# Patient Record
Sex: FEMALE | Race: WHITE
[De-identification: names, ages, dates, MRNs, and addresses within clinical notes are randomized per-mention and may not be internally consistent; named-entity substitution may affect disease eponyms.]

---

## 2018-07-17 ENCOUNTER — HOSPITAL ENCOUNTER (EMERGENCY)
Dept: HOSPITAL 25 - UCCORT | Age: 73
Discharge: HOME | End: 2018-07-17
Payer: MEDICARE

## 2018-07-17 VITALS — DIASTOLIC BLOOD PRESSURE: 60 MMHG | SYSTOLIC BLOOD PRESSURE: 133 MMHG

## 2018-07-17 DIAGNOSIS — E07.9: ICD-10-CM

## 2018-07-17 DIAGNOSIS — B96.20: ICD-10-CM

## 2018-07-17 DIAGNOSIS — E78.5: ICD-10-CM

## 2018-07-17 DIAGNOSIS — I10: ICD-10-CM

## 2018-07-17 DIAGNOSIS — E11.9: ICD-10-CM

## 2018-07-17 DIAGNOSIS — Z86.73: ICD-10-CM

## 2018-07-17 DIAGNOSIS — N39.0: Primary | ICD-10-CM

## 2018-07-17 PROCEDURE — G0463 HOSPITAL OUTPT CLINIC VISIT: HCPCS

## 2018-07-17 PROCEDURE — 81003 URINALYSIS AUTO W/O SCOPE: CPT

## 2018-07-17 PROCEDURE — 99202 OFFICE O/P NEW SF 15 MIN: CPT

## 2018-07-17 PROCEDURE — 87077 CULTURE AEROBIC IDENTIFY: CPT

## 2018-07-17 PROCEDURE — 87086 URINE CULTURE/COLONY COUNT: CPT

## 2018-07-17 PROCEDURE — 87186 SC STD MICRODIL/AGAR DIL: CPT

## 2018-07-17 NOTE — UC
Complaint Female HPI





- HPI Summary


HPI Summary: 





Patient is visiting from out of town.  She is complaining of urinary 

incontinence at night, frequent urination, a sense of urgency and a urine feels 

"hot".  She also notes that today her urine smells foul.  She denies any 

associated fever or abdominal pain.  She's had vague symptoms since the 13th of 

last month.





- History Of Current Complaint


Chief Complaint: UCGU


Stated Complaint: URINARY


Time Seen by Provider: 18 10:56


Hx Obtained From: Patient


Onset/Duration: Gradual Onset


Timing: Constant


Pain Intensity: 0


Aggravating Factor(s): Nothing


Alleviating Factor(s): Nothing


Associated Signs And Symptoms: Negative: Fever, Back Pain





- Allergies/Home Medications


Allergies/Adverse Reactions: 


 Allergies











Allergy/AdvReac Type Severity Reaction Status Date / Time


 


No Known Allergies Allergy   Verified 18 10:33











Home Medications: 


 Home Medications





Cholecalciferol TAB* [Vitamin D TAB*] 1,000 unit PO DAILY 18 [History 

Confirmed 18]


Citalopram TAB* [CeleXA TAB*] 10 mg PO DAILY 18 [History Confirmed ]


Docusate CAP* [Colace Cap*] 100 mg PO DAILY 18 [History Confirmed 18

]


Famotidine TAB* [Pepcid 20 MG TAB*] 20 mg PO BID 18 [History Confirmed ]


Febuxostat(NF) [Uloric(NF)] 40 mg PO DAILY 18 [History Confirmed 18]


Ferrous Gluconate TAB* [Fergon TAB*] 325 mg PO TID 18 [History Confirmed 

18]


Levothyroxine TAB* [Synthroid TAB*] 25 mcg PO DAILY 18 [History Confirmed 

18]


LoraTADine TAB(NF) [Claritin 10 MG TAB(NF)] 10 mg PO DAILY 18 [History 

Confirmed 18]


Magnesium Oxide [Magnesium] 400 mg PO DAILY 18 [History Confirmed 18

]


Pregabalin CAP(*) [Lyrica CAP(*)] 25 mg PO BID 18 [History Confirmed ]


Simvastatin TAB(NF) [Zocor(NF)] 10 mg PO DAILY 18 [History Confirmed ]


Solifenacin(NF) [Vesicare(NF)] 5 mg PO DAILY 18 [History Confirmed ]


Warfarin TAB(*) [Coumadin TAB(*)] 1 mg PO DAILY 18 [History Confirmed ]


Zinc 50 mg PO DAILY 18 [History Confirmed 18]











PMH/Surg Hx/FS Hx/Imm Hx





- Additional Past Medical History


Additional PMH: 





UTI


Endocrine History: Diabetes, Thyroid Disease, Dyslipidemia


Cardiovascular History: Hypertension


Neurological History: TIA





- Surgical History


Surgical History: Yes


Surgery Procedure, Year, and Place: thyroid.  spleenectomy.  Tonsilectomy.  

tulio.  hysterectomy.  3 .  bilateral knee replacement.  shoulder





- Family History


Known Family History: Positive: Cardiac Disease





- Social History


Occupation: Retired


Lives: [facility]


Alcohol Use: None


Substance Use Type: None


Smoking Status (MU): Never Smoked Tobacco





- Immunization History


Vaccination Up to Date: Yes





Review of Systems


Constitutional: Negative


Skin: Negative


Eyes: Negative


ENT: Negative


Respiratory: Negative


Cardiovascular: Negative


Gastrointestinal: Negative


Genitourinary: Dysuria, Frequency, Urgency


Motor: Negative


Neurovascular: Negative


Musculoskeletal: Negative


Neurological: Negative


Psychological: Negative


Is Patient Immunocompromised?: No


All Other Systems Reviewed And Are Negative: Yes





Physical Exam


Triage Information Reviewed: Yes


Appearance: Well-Appearing


Vital Signs: 


 Initial Vital Signs











Temp  98.3 F   18 10:34


 


Pulse  53   18 10:34


 


Resp  18   18 10:34


 


BP  133/60   18 10:34


 


Pulse Ox  100   18 10:34











Vital Signs Reviewed: Yes


Eyes: Positive: Conjunctiva Clear


ENT: Positive: Normal ENT inspection


Neck: Positive: Supple, Nontender, No Lymphadenopathy


Respiratory: Positive: Lungs clear, Normal breath sounds


Cardiovascular: Positive: RRR, No Murmur


Abdomen Description: Positive: Nontender, No Organomegaly, Soft.  Negative: CVA 

Tenderness (R), CVA Tenderness (L), Distended, Guarding


Bowel Sounds: Positive: Present


Musculoskeletal: Positive: ROM Intact


Neurological: Positive: Alert


Psychological: Positive: Normal Response To Family, Age Appropriate Behavior


Skin Exam: Normal





Diagnostics





- Laboratory


Diagnostic Studies Completed/Ordered: U/A= POSITIVE FOR PROTEIN, LEUKOCYTES, 

NITRITIES WITH CULTURE PENDING.





 Complaint Female Dx





- Course


Course Of Treatment: NON TOXIC. NO ACUTE ABDOMEN.





- Differential Dx/Diagnosis


Provider Diagnoses: UTI





Discharge





- Sign-Out/Discharge


Documenting (check all that apply): Patient Departure





- Discharge Plan


Condition: Stable


Disposition: HOME


Prescriptions: 


Cephalexin CAP* [Keflex CAP*] 500 mg PO TID #21 cap


Patient Education Materials:  Urinary Tract Infection in Women (DC)


Referrals: 


No Primary Care Phys,NOPCP [Primary Care Provider] - 


Additional Instructions: 


FOLLOW UP WITH YOUR PRIMARY CARE IN [place] IN 7 DAYS





- Billing Disposition and Condition


Condition: STABLE


Disposition: Home

## 2018-07-30 ENCOUNTER — HOSPITAL ENCOUNTER (EMERGENCY)
Dept: HOSPITAL 25 - UCCORT | Age: 73
Discharge: HOME | End: 2018-07-30
Payer: MEDICARE

## 2018-07-30 VITALS — SYSTOLIC BLOOD PRESSURE: 145 MMHG | DIASTOLIC BLOOD PRESSURE: 81 MMHG

## 2018-07-30 DIAGNOSIS — N39.0: Primary | ICD-10-CM

## 2018-07-30 DIAGNOSIS — Z96.653: ICD-10-CM

## 2018-07-30 PROCEDURE — 87086 URINE CULTURE/COLONY COUNT: CPT

## 2018-07-30 PROCEDURE — G0463 HOSPITAL OUTPT CLINIC VISIT: HCPCS

## 2018-07-30 PROCEDURE — 81003 URINALYSIS AUTO W/O SCOPE: CPT

## 2018-07-30 PROCEDURE — 99212 OFFICE O/P EST SF 10 MIN: CPT

## 2018-07-30 NOTE — UC
Complaint Female HPI





- HPI Summary


HPI Summary: 





Pt presents with c/o urinary symptoms of frequency, urgency and dysuria. P twas 

seen here on 18 and diagnosed with UTI, given keflex 500 mg PO Q8H and 

completed medication as directed with no improvement in urinary symptoms. 





- History Of Current Complaint


Chief Complaint: UCGU


Stated Complaint: URINARY-PERSONAL


Time Seen by Provider: 18 16:31


Hx Obtained From: Patient


Pregnant?: No


Onset/Duration: Sudden Onset, Lasting Days, Still Present


Timing: Constant


Severity Initially: Mild


Severity Currently: Mild


Pain Intensity: 0


Character: Burning


Aggravating Factor(s): Urination


Alleviating Factor(s): Nothing


Associated Signs And Symptoms: Positive: Negative


Related Hx: Similar Episode/Dx as: - UTI





- Risk Factors


Ovarian Torsion Risk Factor: Negative





- Allergies/Home Medications


Allergies/Adverse Reactions: 


 Allergies











Allergy/AdvReac Type Severity Reaction Status Date / Time


 


No Known Allergies Allergy   Verified 18 16:37














PMH/Surg Hx/FS Hx/Imm Hx


Previously Healthy: Yes


Endocrine History: Thyroid Disease, Hypothyroidism


Cardiovascular History: Cardiac Disease


Psychological History: Anxiety





- Surgical History


Surgical History: Yes


Surgery Procedure, Year, and Place: thyroid.  spleenectomy.  Tonsilectomy.  

tulio.  hysterectomy.  3 .  bilateral knee replacement.  shoulder





- Family History


Known Family History: Positive: Cardiac Disease





- Social History


Occupation: Retired


Lives: With Family


Alcohol Use: None


Substance Use Type: None


Smoking Status (MU): Never Smoked Tobacco


Have You Smoked in the Last Year: No





- Immunization History


Vaccination Up to Date: Yes





Review of Systems


Constitutional: Negative


Skin: Negative


Eyes: Negative


ENT: Negative


Respiratory: Negative


Cardiovascular: Negative


Gastrointestinal: Negative


Genitourinary: Dysuria, Frequency, Urgency, Other - urinary incontinence


Motor: Negative


Neurovascular: Negative


Musculoskeletal: Negative


Neurological: Negative, Headache


Is Patient Immunocompromised?: No


All Other Systems Reviewed And Are Negative: Yes





Physical Exam


Triage Information Reviewed: Yes


Appearance: Well-Appearing


Vital Signs: 


 Initial Vital Signs











Temp  97.6 F   18 16:30


 


Pulse  59   18 16:30


 


Resp  16   18 16:30


 


BP  145/81   18 16:30


 


Pulse Ox  100   18 16:30











Vital Signs Reviewed: Yes


Eye Exam: Normal


ENT: Positive: Hearing grossly normal


Dental Exam: Normal


Neck exam: Normal


Respiratory: Positive: No respiratory distress


Musculoskeletal Exam: Normal


Neurological Exam: Normal


Psychological Exam: Normal


Skin Exam: Normal





 Complaint Female Dx





- Differential Dx/Diagnosis


Differential Diagnosis/HQI/PQRI: Urinary Tract Infection


Provider Diagnoses: UTI





Discharge





- Sign-Out/Discharge


Documenting (check all that apply): Patient Departure





- Discharge Plan


Condition: Stable


Disposition: HOME


Prescriptions: 


Ciprofloxacin TAB* [Cipro 500 MG TAB*] 500 mg PO Q12H #14 tab


Phenazopyridine TAB* [Pyridium 100 mg TAB*] 100 mg PO Q8H #6 tab


Patient Education Materials:  Urinary Tract Infection in Women (ED)


Referrals: 


[facility]FERR[facility] [Outside] - If Needed


No Primary Care Phys,NOPCP [Primary Care Provider] - 


Mac Mohan MD [Medical Doctor] - If Needed





- Billing Disposition and Condition


Condition: STABLE


Disposition: Home